# Patient Record
Sex: MALE | Race: WHITE | NOT HISPANIC OR LATINO | Employment: OTHER | URBAN - METROPOLITAN AREA
[De-identification: names, ages, dates, MRNs, and addresses within clinical notes are randomized per-mention and may not be internally consistent; named-entity substitution may affect disease eponyms.]

---

## 2018-07-23 ENCOUNTER — HISTORICAL (OUTPATIENT)
Dept: RADIOLOGY | Facility: HOSPITAL | Age: 58
End: 2018-07-23

## 2021-09-08 LAB
ABS NEUT (OLG): 5.1 X10(3)/MCL (ref 1.5–6.9)
ALBUMIN SERPL-MCNC: 4.3 GM/DL (ref 3.5–5)
ALBUMIN/GLOB SERPL: 1.6 RATIO (ref 1.1–2)
ALP SERPL-CCNC: 94 UNIT/L (ref 40–150)
ALT SERPL-CCNC: 34 UNIT/L (ref 0–55)
APTT PPP: 28.3 SEC (ref 23.4–34.9)
AST SERPL-CCNC: 30 UNIT/L (ref 5–34)
BILIRUB SERPL-MCNC: 1 MG/DL
BILIRUBIN DIRECT+TOT PNL SERPL-MCNC: 0.3 MG/DL (ref 0–0.5)
BILIRUBIN DIRECT+TOT PNL SERPL-MCNC: 0.7 MG/DL (ref 0–0.8)
BUN SERPL-MCNC: 12 MG/DL (ref 8.4–25.7)
CALCIUM SERPL-MCNC: 8.8 MG/DL (ref 8.4–10.2)
CHLORIDE SERPL-SCNC: 108 MMOL/L (ref 98–107)
CO2 SERPL-SCNC: 25 MMOL/L (ref 22–29)
CREAT SERPL-MCNC: 1.02 MG/DL (ref 0.73–1.18)
ERYTHROCYTE [DISTWIDTH] IN BLOOD BY AUTOMATED COUNT: 12.3 % (ref 11.5–17)
EST CREAT CLEARANCE SER (OHS): 98.7 ML/MIN
GLOBULIN SER-MCNC: 2.7 GM/DL (ref 2.4–3.5)
GLUCOSE SERPL-MCNC: 94 MG/DL (ref 74–100)
HCT VFR BLD AUTO: 44.5 % (ref 42–52)
HGB BLD-MCNC: 15.6 GM/DL (ref 14–18)
INR PPP: 1 (ref 2–3)
MCH RBC QN AUTO: 31 PG (ref 27–34)
MCHC RBC AUTO-ENTMCNC: 35 GM/DL (ref 31–36)
MCV RBC AUTO: 89 FL (ref 80–99)
PLATELET # BLD AUTO: 164 X10(3)/MCL (ref 140–400)
PMV BLD AUTO: 9.5 FL (ref 6.8–10)
POTASSIUM SERPL-SCNC: 3.6 MMOL/L (ref 3.5–5.1)
PROT SERPL-MCNC: 7 GM/DL (ref 6.4–8.3)
PROTHROMBIN TIME: 13 SEC (ref 11.7–14.5)
RBC # BLD AUTO: 5.01 X10(6)/MCL (ref 4.7–6.1)
SODIUM SERPL-SCNC: 139 MMOL/L (ref 136–145)
WBC # SPEC AUTO: 7.9 X10(3)/MCL (ref 4.5–11.5)

## 2021-09-09 ENCOUNTER — HISTORICAL (OUTPATIENT)
Dept: ANESTHESIOLOGY | Facility: HOSPITAL | Age: 61
End: 2021-09-09

## 2022-04-29 NOTE — OP NOTE
PREOPERATIVE DIAGNOSIS:  Screening colonoscopy.    POSTOPERATIVE DIAGNOSIS:  Left-sided diverticular disease, suboptimal prep, otherwise no pathology.    INDICATIONS:  60-year-old white male upon whom I performed colonoscopy in 2016, within normal limits.  He is back here for screening.  No abnormalities noted at that time.  He has noted average colorectal cancer risks.  No alarm symptoms or signs.     He was consented for the procedure prior to.  The risks of the procedure were explained to him in detail.  He is willing to undergo the risks.    DESCRIPTION OF PROCEDURE:  He was brought down to the endoscopy suite and laid in the left lateral decubitus position, and rectal exam was performed.  There were no abnormalities noted.  Prostate was normal size and normal shape without any nodularity.  Rectal exam tone was appropriate.     The Olympus colonoscope was easily passed all the way to the cecum.  The cecum was visualized and photographed.  I saw the terminal ileal orifice.  It was photographed, partially intubated.  There were no abnormalities noted.  Prep was suboptimal.     360-degree circumferential views were taken of the entirety of the colon.  The cecum was free of any mass, lesions, or polyps, as well as the ascending colon, hepatic flexure, transverse colon, and splenic flexure.  Just in the descending colon, he did have some diverticular disease that was mild.  Otherwise, no polyps or masses noted.  Rectum in retroflexion showed no polyps, masses, or any other mucosal changes.  The scope was withdrawn.  Patient tolerated the procedure well.     In summary, this is a 60-year-old white male with left-sided diverticular disease and suboptimal prep.    RECOMMENDATIONS:  Repeat colonoscopy in 5 years.       Thank you, Dr. Radha Reynoso, for allowing me to participate in the care of your patient.        MICHELINE/ANTHONY   DD: 09/09/2021 0927   DT: 09/09/2021 0943  Job # [id number]    cc: Dr. Garcia  [place]

## 2023-01-09 ENCOUNTER — HOSPITAL ENCOUNTER (OUTPATIENT)
Dept: RADIOLOGY | Facility: HOSPITAL | Age: 63
Discharge: HOME | End: 2023-01-09
Attending: FAMILY MEDICINE
Payer: COMMERCIAL

## 2023-01-09 DIAGNOSIS — M25.551 PAIN IN RIGHT HIP: Primary | ICD-10-CM

## 2023-01-09 DIAGNOSIS — M25.551 PAIN IN RIGHT HIP: ICD-10-CM

## 2023-01-09 PROCEDURE — 72170 X-RAY EXAM OF PELVIS: CPT | Mod: TC

## 2023-01-10 ENCOUNTER — HOSPITAL ENCOUNTER (OUTPATIENT)
Dept: RADIOLOGY | Facility: HOSPITAL | Age: 63
Discharge: HOME | End: 2023-01-10
Attending: FAMILY MEDICINE
Payer: COMMERCIAL

## 2023-01-10 DIAGNOSIS — M25.551 PAIN IN RIGHT HIP: ICD-10-CM

## 2023-01-10 PROCEDURE — 73721 MRI JNT OF LWR EXTRE W/O DYE: CPT | Mod: TC,RT

## 2023-03-28 ENCOUNTER — HOSPITAL ENCOUNTER (OUTPATIENT)
Dept: RADIOLOGY | Facility: HOSPITAL | Age: 63
Discharge: HOME | End: 2023-03-28
Attending: FAMILY MEDICINE
Payer: COMMERCIAL

## 2023-03-28 DIAGNOSIS — R10.32 LEFT LOWER QUADRANT PAIN: ICD-10-CM

## 2023-03-28 LAB
CREAT SERPL-MCNC: 1.2 MG/DL (ref 0.5–1.4)
SAMPLE: NORMAL

## 2023-03-28 PROCEDURE — 74177 CT ABD & PELVIS W/CONTRAST: CPT | Mod: TC

## 2023-03-28 PROCEDURE — 25500020 PHARM REV CODE 255: Mod: 59 | Performed by: FAMILY MEDICINE

## 2023-03-28 RX ADMIN — DIATRIZOATE MEGLUMINE AND DIATRIZOATE SODIUM 30 ML: 660; 100 LIQUID ORAL; RECTAL at 11:03

## 2023-03-28 RX ADMIN — IOPAMIDOL 100 ML: 755 INJECTION, SOLUTION INTRAVENOUS at 11:03

## 2023-05-22 ENCOUNTER — HOSPITAL ENCOUNTER (OUTPATIENT)
Dept: RADIOLOGY | Facility: HOSPITAL | Age: 63
Discharge: HOME | End: 2023-05-22
Attending: FAMILY MEDICINE
Payer: COMMERCIAL

## 2023-05-22 DIAGNOSIS — R10.84 GENERALIZED ABDOMINAL PAIN: ICD-10-CM

## 2023-05-22 LAB
CREAT SERPL-MCNC: 1.1 MG/DL (ref 0.5–1.4)
SAMPLE: NORMAL

## 2023-05-22 PROCEDURE — 74177 CT ABD & PELVIS W/CONTRAST: CPT | Mod: TC

## 2023-05-22 PROCEDURE — 25500020 PHARM REV CODE 255: Performed by: FAMILY MEDICINE

## 2023-05-22 RX ADMIN — IOPAMIDOL 100 ML: 755 INJECTION, SOLUTION INTRAVENOUS at 03:05

## 2023-05-22 RX ADMIN — DIATRIZOATE MEGLUMINE AND DIATRIZOATE SODIUM 30 ML: 660; 100 LIQUID ORAL; RECTAL at 03:05

## 2023-06-02 ENCOUNTER — LAB VISIT (OUTPATIENT)
Dept: LAB | Facility: HOSPITAL | Age: 63
End: 2023-06-02
Attending: FAMILY MEDICINE
Payer: COMMERCIAL

## 2023-06-02 DIAGNOSIS — R19.7 DIARRHEA OF PRESUMED INFECTIOUS ORIGIN: Primary | ICD-10-CM

## 2023-06-02 LAB
CLOSTRIDIUM DIFFICILE GDH ANTIGEN (OHS): NEGATIVE
CLOSTRIDIUM DIFFICILE TOXIN A/B (OHS): NEGATIVE

## 2023-06-02 PROCEDURE — 86318 IA INFECTIOUS AGENT ANTIBODY: CPT

## 2023-06-05 ENCOUNTER — APPOINTMENT (OUTPATIENT)
Dept: LAB | Facility: HOSPITAL | Age: 63
End: 2023-06-05
Attending: FAMILY MEDICINE
Payer: COMMERCIAL

## 2023-06-05 PROCEDURE — 87507 IADNA-DNA/RNA PROBE TQ 12-25: CPT | Performed by: FAMILY MEDICINE

## 2023-06-06 LAB

## 2025-04-24 RX ORDER — BUPROPION HYDROCHLORIDE 300 MG/1
300 TABLET ORAL
COMMUNITY
Start: 2021-09-08

## 2025-04-24 RX ORDER — TESTOSTERONE ENANTHATE 200 MG/ML
100 VIAL (ML) INTRAMUSCULAR
COMMUNITY
Start: 2021-09-08

## 2025-04-24 RX ORDER — DULOXETIN HYDROCHLORIDE 60 MG/1
60 CAPSULE, DELAYED RELEASE ORAL
COMMUNITY
Start: 2021-09-08

## 2025-04-24 RX ORDER — OXCARBAZEPINE 150 MG/1
150 TABLET, FILM COATED ORAL
COMMUNITY
Start: 2021-09-08

## 2025-04-24 RX ORDER — ALLOPURINOL 300 MG/1
300 TABLET ORAL
COMMUNITY
Start: 2021-09-08

## 2025-04-28 ENCOUNTER — ANESTHESIA EVENT (OUTPATIENT)
Facility: HOSPITAL | Age: 65
End: 2025-04-28
Payer: COMMERCIAL

## 2025-04-28 NOTE — ANESTHESIA PREPROCEDURE EVALUATION
"                                                                                                             04/28/2025  Lázaro Jane is a 64 y.o., male presents as an outpatient for left cataract extraction.    Last 3 sets of Vitals        4/24/2025    11:36 AM 4/29/2025     5:50 AM   Vitals - 1 value per visit   SYSTOLIC  155   DIASTOLIC  92   Pulse  85   Temp  36.6 °C (97.9 °F)   SPO2  97 %   Weight (lb) 193    Weight (kg) 87.544    Height 5' 8" (1.727 m)    BMI (Calculated) 29.4          Lab Results   Component Value Date    WBC 16.76 (H) 05/22/2023    HGB 17.1 05/22/2023    HCT 50.2 05/22/2023    MCV 89.2 05/22/2023     05/22/2023            Chemistry        Component Value Date/Time     05/22/2023 1605    K 4.4 05/22/2023 1605     05/22/2023 1605    CO2 26 05/22/2023 1605    BUN 14.0 05/22/2023 1605    CREATININE 1.00 05/22/2023 1605        Component Value Date/Time    CALCIUM 10.4 (H) 05/22/2023 1605    ALKPHOS 108 05/22/2023 1605    AST 48 (H) 05/22/2023 1605    ALT 50 05/22/2023 1605    BILITOT 1.7 (H) 05/22/2023 1605    EGFRNONAA >60 09/08/2021 1009      Pre-op Assessment    I have reviewed the Patient Summary Reports.    I have reviewed the NPO Status.   I have reviewed the Medications.     Review of Systems  Anesthesia Hx:               Denies Personal Hx of Anesthesia complications.                    Social:  Non-Smoker       Cardiovascular:   Functional Capacity good / => 4 METS                         Endocrine:  Diabetes, type 2               Physical Exam  General: Well nourished, Cooperative, Alert and Oriented    Airway:  Mallampati: II   Mouth Opening: Normal  TM Distance: Normal  Tongue: Normal  Neck ROM: Normal ROM    Dental:  Intact    Chest/Lungs:  Clear to auscultation, Normal Respiratory Rate    Heart:  Rate: Normal  Rhythm: Regular Rhythm      Anesthesia Plan  Type of Anesthesia, risks & benefits discussed:    Anesthesia Type: Gen Natural Airway  Intra-op Monitoring " Plan: Standard ASA Monitors  Induction:  IV  Informed Consent: Informed consent signed with the Patient and all parties understand the risks and agree with anesthesia plan.  All questions answered.   ASA Score: 2  Day of Surgery Review of History & Physical: H&P Update referred to the surgeon/provider.    Ready For Surgery From Anesthesia Perspective.     .

## 2025-04-29 ENCOUNTER — HOSPITAL ENCOUNTER (OUTPATIENT)
Facility: HOSPITAL | Age: 65
Discharge: HOME | End: 2025-04-29
Attending: OPHTHALMOLOGY | Admitting: OPHTHALMOLOGY
Payer: COMMERCIAL

## 2025-04-29 ENCOUNTER — ANESTHESIA (OUTPATIENT)
Facility: HOSPITAL | Age: 65
End: 2025-04-29
Payer: COMMERCIAL

## 2025-04-29 VITALS
WEIGHT: 193 LBS | HEART RATE: 94 BPM | TEMPERATURE: 98 F | BODY MASS INDEX: 29.25 KG/M2 | OXYGEN SATURATION: 96 % | SYSTOLIC BLOOD PRESSURE: 148 MMHG | DIASTOLIC BLOOD PRESSURE: 96 MMHG | HEIGHT: 68 IN

## 2025-04-29 DIAGNOSIS — H26.9 CATARACT: ICD-10-CM

## 2025-04-29 LAB
POCT GLUCOSE: 112 MG/DL (ref 70–110)
POCT GLUCOSE: 128 MG/DL (ref 70–110)

## 2025-04-29 PROCEDURE — 36000707: Performed by: OPHTHALMOLOGY

## 2025-04-29 PROCEDURE — 36000706: Performed by: OPHTHALMOLOGY

## 2025-04-29 PROCEDURE — 63600175 PHARM REV CODE 636 W HCPCS: Performed by: OPHTHALMOLOGY

## 2025-04-29 PROCEDURE — V2632 POST CHMBR INTRAOCULAR LENS: HCPCS | Performed by: OPHTHALMOLOGY

## 2025-04-29 PROCEDURE — 71000015 HC POSTOP RECOV 1ST HR: Performed by: OPHTHALMOLOGY

## 2025-04-29 PROCEDURE — 37000009 HC ANESTHESIA EA ADD 15 MINS: Performed by: OPHTHALMOLOGY

## 2025-04-29 PROCEDURE — 25000003 PHARM REV CODE 250

## 2025-04-29 PROCEDURE — 37000008 HC ANESTHESIA 1ST 15 MINUTES: Performed by: OPHTHALMOLOGY

## 2025-04-29 PROCEDURE — 71000016 HC POSTOP RECOV ADDL HR: Performed by: OPHTHALMOLOGY

## 2025-04-29 PROCEDURE — 63600175 PHARM REV CODE 636 W HCPCS

## 2025-04-29 PROCEDURE — 25000003 PHARM REV CODE 250: Performed by: OPHTHALMOLOGY

## 2025-04-29 PROCEDURE — 27201423 OPTIME MED/SURG SUP & DEVICES STERILE SUPPLY: Performed by: OPHTHALMOLOGY

## 2025-04-29 DEVICE — TECNIS SIMPLICITY TECNIS EYHANCE U 19.5D
Type: IMPLANTABLE DEVICE | Site: EYE | Status: FUNCTIONAL
Brand: TECNIS SIMPLICITY

## 2025-04-29 RX ORDER — BUPIVACAINE HYDROCHLORIDE 7.5 MG/ML
INJECTION, SOLUTION EPIDURAL; RETROBULBAR
Status: DISCONTINUED
Start: 2025-04-29 | End: 2025-04-29 | Stop reason: HOSPADM

## 2025-04-29 RX ORDER — LORAZEPAM 2 MG/ML
0.25 INJECTION INTRAMUSCULAR ONCE AS NEEDED
OUTPATIENT
Start: 2025-04-29 | End: 2036-09-24

## 2025-04-29 RX ORDER — POVIDONE-IODINE 5 %
SOLUTION, NON-ORAL OPHTHALMIC (EYE)
Status: DISCONTINUED | OUTPATIENT
Start: 2025-04-29 | End: 2025-04-29 | Stop reason: HOSPADM

## 2025-04-29 RX ORDER — ONDANSETRON 4 MG/1
8 TABLET, ORALLY DISINTEGRATING ORAL EVERY 6 HOURS PRN
OUTPATIENT
Start: 2025-04-29

## 2025-04-29 RX ORDER — NEOMYCIN SULFATE, POLYMYXIN B SULFATE, AND DEXAMETHASONE 3.5; 10000; 1 MG/G; [USP'U]/G; MG/G
OINTMENT OPHTHALMIC
Status: DISCONTINUED
Start: 2025-04-29 | End: 2025-04-29 | Stop reason: HOSPADM

## 2025-04-29 RX ORDER — PHENYLEPHRINE HYDROCHLORIDE 100 MG/ML
2 SOLUTION/ DROPS OPHTHALMIC
Status: COMPLETED | OUTPATIENT
Start: 2025-04-29 | End: 2025-04-29

## 2025-04-29 RX ORDER — LIDOCAINE HYDROCHLORIDE 10 MG/ML
1 INJECTION, SOLUTION EPIDURAL; INFILTRATION; INTRACAUDAL; PERINEURAL ONCE
OUTPATIENT
Start: 2025-04-29 | End: 2025-04-29

## 2025-04-29 RX ORDER — BUPIVACAINE HYDROCHLORIDE 7.5 MG/ML
INJECTION, SOLUTION EPIDURAL; RETROBULBAR
Status: DISCONTINUED | OUTPATIENT
Start: 2025-04-29 | End: 2025-04-29 | Stop reason: HOSPADM

## 2025-04-29 RX ORDER — BUPIVACAINE HYDROCHLORIDE 7.5 MG/ML
0.1 INJECTION, SOLUTION EPIDURAL; RETROBULBAR
Status: COMPLETED | OUTPATIENT
Start: 2025-04-29 | End: 2025-04-29

## 2025-04-29 RX ORDER — EPINEPHRINE 1 MG/ML
INJECTION, SOLUTION, CONCENTRATE INTRAVENOUS
Status: DISCONTINUED | OUTPATIENT
Start: 2025-04-29 | End: 2025-04-29 | Stop reason: HOSPADM

## 2025-04-29 RX ORDER — ONDANSETRON HYDROCHLORIDE 2 MG/ML
4 INJECTION, SOLUTION INTRAVENOUS DAILY PRN
OUTPATIENT
Start: 2025-04-29

## 2025-04-29 RX ORDER — GLUCAGON 1 MG
1 KIT INJECTION
OUTPATIENT
Start: 2025-04-29

## 2025-04-29 RX ORDER — EPINEPHRINE 1 MG/ML
INJECTION, SOLUTION, CONCENTRATE INTRAVENOUS
Status: DISCONTINUED
Start: 2025-04-29 | End: 2025-04-29 | Stop reason: HOSPADM

## 2025-04-29 RX ORDER — PROCHLORPERAZINE EDISYLATE 5 MG/ML
5 INJECTION INTRAMUSCULAR; INTRAVENOUS EVERY 30 MIN PRN
OUTPATIENT
Start: 2025-04-29

## 2025-04-29 RX ORDER — POVIDONE-IODINE 5 %
SOLUTION, NON-ORAL OPHTHALMIC (EYE)
Status: DISCONTINUED
Start: 2025-04-29 | End: 2025-04-29 | Stop reason: HOSPADM

## 2025-04-29 RX ORDER — IPRATROPIUM BROMIDE AND ALBUTEROL SULFATE 2.5; .5 MG/3ML; MG/3ML
3 SOLUTION RESPIRATORY (INHALATION)
OUTPATIENT
Start: 2025-04-29

## 2025-04-29 RX ORDER — LIDOCAINE HYDROCHLORIDE 40 MG/ML
INJECTION, SOLUTION RETROBULBAR
Status: DISCONTINUED
Start: 2025-04-29 | End: 2025-04-29 | Stop reason: HOSPADM

## 2025-04-29 RX ORDER — MIDAZOLAM HYDROCHLORIDE 2 MG/2ML
2 INJECTION, SOLUTION INTRAMUSCULAR; INTRAVENOUS ONCE AS NEEDED
OUTPATIENT
Start: 2025-04-29 | End: 2036-09-24

## 2025-04-29 RX ORDER — MOXIFLOXACIN 5 MG/ML
1 SOLUTION/ DROPS OPHTHALMIC
Status: COMPLETED | OUTPATIENT
Start: 2025-04-29 | End: 2025-04-29

## 2025-04-29 RX ORDER — MIDAZOLAM HYDROCHLORIDE 1 MG/ML
INJECTION INTRAMUSCULAR; INTRAVENOUS
Status: DISCONTINUED | OUTPATIENT
Start: 2025-04-29 | End: 2025-04-29

## 2025-04-29 RX ORDER — METFORMIN HYDROCHLORIDE 500 MG/1
500 TABLET, EXTENDED RELEASE ORAL NIGHTLY
COMMUNITY

## 2025-04-29 RX ORDER — LIDOCAINE HYDROCHLORIDE 40 MG/ML
INJECTION, SOLUTION RETROBULBAR
Status: DISCONTINUED | OUTPATIENT
Start: 2025-04-29 | End: 2025-04-29 | Stop reason: HOSPADM

## 2025-04-29 RX ORDER — TROPICAMIDE 10 MG/ML
2 SOLUTION/ DROPS OPHTHALMIC
Status: COMPLETED | OUTPATIENT
Start: 2025-04-29 | End: 2025-04-29

## 2025-04-29 RX ADMIN — BUPIVACAINE HYDROCHLORIDE 0.75 MG: 7.5 INJECTION, SOLUTION EPIDURAL; RETROBULBAR at 06:04

## 2025-04-29 RX ADMIN — PHENYLEPHRINE HYDROCHLORIDE 2 DROP: 100 SOLUTION/ DROPS OPHTHALMIC at 06:04

## 2025-04-29 RX ADMIN — TROPICAMIDE 2 DROP: 10 SOLUTION/ DROPS OPHTHALMIC at 06:04

## 2025-04-29 RX ADMIN — MIDAZOLAM HYDROCHLORIDE 2 MG: 1 INJECTION, SOLUTION INTRAMUSCULAR; INTRAVENOUS at 06:04

## 2025-04-29 RX ADMIN — MOXIFLOXACIN 1 DROP: 5 SOLUTION/ DROPS OPHTHALMIC at 06:04

## 2025-04-29 NOTE — OP NOTE
[facility] Surgical - Periop Services 2nd Floor  Operative Note      Date of Procedure: 4/29/2025     Procedure: Procedure(s) (LRB):  PHACOEMULSIFICATION, CATARACT, WITH IOL INSERTION     //////left eye (Left)     Surgeons and Role:     * Marcos Odom MD - Primary    Assisting : TACHO Stout    Pre-Operative Diagnosis: Combined forms of age-related cataract of left eye [H25.812]    Post-Operative Diagnosis: Post-Op Diagnosis Codes:     * Combined forms of age-related cataract of left eye [H25.812]    Anesthesia: Monitor Anesthesia Care    Operative Findings (including complications, if any):     Description of Technical Procedures: The patient was brought into the operating suite, where the patient was correctly identified as was the operative eye via timeout.  The patient was prepped and draped in a sterile ophthalmic fashion.  A lid speculum was placed in the operative eye and the microscope was brought into place.  A 1.0mm paracentesis was then made at (6) o'clock.  The anterior chamber was filled with Endocoat.  A ( temporal) clear corneal incision was made with a 2.4 mm keratome.  A 6 mm corneal marking ring was used to whitney the cornea centered over the visual axis.  A 5.00 mm continuous curvilinear capsulorhexis was fashioned using a cystotome and microcapsular forceps.  Hydrodissection and hydrodelineation was performed with upreserved 1% Xylocaine.  The nucleus was then phacoemulsified with the Abbott phacoemulsification hand-piece with a total of (0) EFX.  The cortex was then removed with the I/A hand-piece. The lens model (DIB00) with a power of (19.5) was placed in the capsular bag.  The Helon was then removed from the eye with the I/A hand piece.  The anterior chamber was inflated and the wounds were hydrated with BSS.  The wounds were checked with Weck-Cherelle sponges and found to be watertight.  The lid speculum was removed and topical antibiotics were placed on the operative eye.  The patient  was brought to PACU in good condition

## 2025-04-29 NOTE — ANESTHESIA POSTPROCEDURE EVALUATION
Anesthesia Post Evaluation    Patient: Lázaro Jane    Procedure(s) Performed: Procedure(s) (LRB):  PHACOEMULSIFICATION, CATARACT, WITH IOL INSERTION     //////left eye (Left)    Final Anesthesia Type: MAC      Patient location during evaluation: OPS  Patient participation: Yes- Able to Participate  Level of consciousness: awake and alert  Post-procedure vital signs: reviewed and stable  Pain management: adequate  Airway patency: patent    PONV status at discharge: No PONV  Anesthetic complications: no      Cardiovascular status: blood pressure returned to baseline  Respiratory status: unassisted, room air and spontaneous ventilation  Hydration status: euvolemic  Follow-up not needed.              Vitals Value Taken Time   /96 04/29/25 05:50   Temp 36.6 °C (97.9 °F) 04/29/25 05:50   Pulse 89 04/29/25 05:50   Resp 14 04/29/25 07:03   SpO2 97 % 04/29/25 05:50         No case tracking events are documented in the log.      Pain/Pablito Score: No data recorded

## 2025-06-19 ENCOUNTER — ANESTHESIA EVENT (OUTPATIENT)
Facility: HOSPITAL | Age: 65
End: 2025-06-19
Payer: COMMERCIAL

## 2025-06-19 NOTE — ANESTHESIA PREPROCEDURE EVALUATION
"                                                                                                             06/19/2025  Lázaro Jane is a 64 y.o., male.    Pre-operative evaluation for Procedure(s) (LRB):  PHACOEMULSIFICATION, CATARACT, WITH IOL INSERTION    /////right eye (Right)      4/29/2025 L Phaco: MAC; Midazolam 2 mgIV.      Lázaro Jane is a 64 y.o. male     Problem List[1]    Review of patient's allergies indicates:  No Known Allergies    Medications Ordered Prior to Encounter[2]    Past Surgical History:   Procedure Laterality Date    BACK SURGERY  2006    L4-L5    PHACOEMULSIFICATION, CATARACT, WITH IOL INSERTION Left 4/29/2025    Procedure: PHACOEMULSIFICATION, CATARACT, WITH IOL INSERTION     //////left eye;  Surgeon: Marcos Odom MD;  Location: [address];  Service: Ophthalmology;  Laterality: Left;       Social History[3]      CBC: No results for input(s): "WBC", "RBC", "HGB", "HCT", "PLT", "MCV", "MCH", "MCHC" in the last 72 hours.    Diagnostic Studies:  CXR :  EKG :  Echo :  No results found for this or any previous visit.  Pre-op Assessment    I have reviewed the Patient Summary Reports.    I have reviewed the NPO Status.   I have reviewed the Medications.     Review of Systems  Anesthesia Hx:  No problems with previous Anesthesia   History of prior surgery of interest to airway management or planning:  Previous anesthesia: General, MAC L4-5 Surgery: with general anesthesia.       4/2025 Phaco with MAC.  Procedure performed at an [facility]. Airway issues documented on chart review include GETA     Denies Family Hx of Anesthesia complications.    Denies Personal Hx of Anesthesia complications.                    Social:  Non-Smoker Occas ETOH      Cardiovascular:                     Patient not on beta blockers  Functional Capacity good / => 4 METS                         Hepatic/GI:         Not Taking GLP-1 Agonists            Musculoskeletal:     Gout          "   Endocrine:  Diabetes, type 2    Diabetes                      Psych:  Psychiatric History anxiety depression                Physical Exam  General: Well nourished, Cooperative, Alert and Oriented    Airway:  Mallampati: II / II  Mouth Opening: Normal  TM Distance: > 6 cm  Tongue: Normal  Neck ROM: Normal ROM    Dental:  Intact        Anesthesia Plan  Type of Anesthesia, risks & benefits discussed:    Anesthesia Type: MAC  Intra-op Monitoring Plan: Standard ASA Monitors  Induction:  IV  Informed Consent: Informed consent signed with the Patient and all parties understand the risks and agree with anesthesia plan.  All questions answered.   ASA Score: 2  Day of Surgery Review of History & Physical: H&P Update referred to the surgeon/provider.I have interviewed and examined the patient. I have reviewed the patient's H&P dated:   Anesthesia Plan Notes: MAC  mask/NC, GA back-up.   Nasal cannula vs facemask supplemental oxygenation   For patients with LEONARDA/obesity, may consider SuperNoval Nasal CPAP      Ready For Surgery From Anesthesia Perspective.     .           [1] There is no problem list on file for this patient.  [2]   No current facility-administered medications on file prior to encounter.     Current Outpatient Medications on File Prior to Encounter   Medication Sig Dispense Refill    allopurinoL (ZYLOPRIM) 300 MG tablet Take 300 mg by mouth.      buPROPion (WELLBUTRIN XL) 300 MG 24 hr tablet Take 300 mg by mouth.      DULoxetine (CYMBALTA) 60 MG capsule Take 60 mg by mouth.      metFORMIN (GLUCOPHAGE-XR) 500 MG ER 24hr tablet Take 500 mg by mouth nightly.      OXcarbazepine (TRILEPTAL) 150 MG Tab Take 150 mg by mouth.      testosterone enanthate (DELATESTRYL) 200 mg/mL injection Inject 100 mg into the skin.     [3]   Social History  Socioeconomic History    Marital status:    Tobacco Use    Smoking status: Never    Smokeless tobacco: Never   Substance and Sexual Activity    Alcohol use: Yes     Comment:  Occ    Drug use: Never

## 2025-06-20 ENCOUNTER — HOSPITAL ENCOUNTER (OUTPATIENT)
Facility: HOSPITAL | Age: 65
Discharge: HOME | End: 2025-06-20
Attending: OPHTHALMOLOGY | Admitting: OPHTHALMOLOGY
Payer: COMMERCIAL

## 2025-06-20 ENCOUNTER — ANESTHESIA (OUTPATIENT)
Facility: HOSPITAL | Age: 65
End: 2025-06-20
Payer: COMMERCIAL

## 2025-06-20 VITALS
SYSTOLIC BLOOD PRESSURE: 154 MMHG | DIASTOLIC BLOOD PRESSURE: 100 MMHG | BODY MASS INDEX: 29.55 KG/M2 | HEART RATE: 86 BPM | TEMPERATURE: 99 F | OXYGEN SATURATION: 96 % | WEIGHT: 195 LBS | HEIGHT: 68 IN

## 2025-06-20 DIAGNOSIS — H26.9 CATARACT: ICD-10-CM

## 2025-06-20 LAB
POCT GLUCOSE: 111 MG/DL (ref 70–110)
POCT GLUCOSE: 113 MG/DL (ref 70–110)

## 2025-06-20 PROCEDURE — V2632 POST CHMBR INTRAOCULAR LENS: HCPCS | Performed by: OPHTHALMOLOGY

## 2025-06-20 PROCEDURE — 27201423 OPTIME MED/SURG SUP & DEVICES STERILE SUPPLY: Performed by: OPHTHALMOLOGY

## 2025-06-20 PROCEDURE — 63600175 PHARM REV CODE 636 W HCPCS: Performed by: NURSE ANESTHETIST, CERTIFIED REGISTERED

## 2025-06-20 PROCEDURE — 37000008 HC ANESTHESIA 1ST 15 MINUTES: Performed by: OPHTHALMOLOGY

## 2025-06-20 PROCEDURE — 37000009 HC ANESTHESIA EA ADD 15 MINS: Performed by: OPHTHALMOLOGY

## 2025-06-20 PROCEDURE — 25000003 PHARM REV CODE 250: Performed by: OPHTHALMOLOGY

## 2025-06-20 PROCEDURE — 63600175 PHARM REV CODE 636 W HCPCS: Performed by: OPHTHALMOLOGY

## 2025-06-20 PROCEDURE — 36000706: Performed by: OPHTHALMOLOGY

## 2025-06-20 PROCEDURE — 36000707: Performed by: OPHTHALMOLOGY

## 2025-06-20 PROCEDURE — 25000003 PHARM REV CODE 250

## 2025-06-20 PROCEDURE — 71000015 HC POSTOP RECOV 1ST HR: Performed by: OPHTHALMOLOGY

## 2025-06-20 DEVICE — TECNIS SIMPLICITY TECNIS EYHANCE U 20.0D
Type: IMPLANTABLE DEVICE | Site: EYE | Status: FUNCTIONAL
Brand: TECNIS SIMPLICITY

## 2025-06-20 RX ORDER — POVIDONE-IODINE 5 %
SOLUTION, NON-ORAL OPHTHALMIC (EYE)
Status: DISCONTINUED
Start: 2025-06-20 | End: 2025-06-20 | Stop reason: HOSPADM

## 2025-06-20 RX ORDER — BUPIVACAINE HYDROCHLORIDE 7.5 MG/ML
0.1 INJECTION, SOLUTION EPIDURAL; RETROBULBAR
Status: COMPLETED | OUTPATIENT
Start: 2025-06-20 | End: 2025-06-20

## 2025-06-20 RX ORDER — BUPIVACAINE HYDROCHLORIDE 7.5 MG/ML
INJECTION, SOLUTION EPIDURAL; RETROBULBAR
Status: DISCONTINUED
Start: 2025-06-20 | End: 2025-06-20 | Stop reason: HOSPADM

## 2025-06-20 RX ORDER — EPINEPHRINE 1 MG/ML
INJECTION, SOLUTION, CONCENTRATE INTRAVENOUS
Status: DISCONTINUED | OUTPATIENT
Start: 2025-06-20 | End: 2025-06-20 | Stop reason: HOSPADM

## 2025-06-20 RX ORDER — POVIDONE-IODINE 5 %
SOLUTION, NON-ORAL OPHTHALMIC (EYE)
Status: DISCONTINUED | OUTPATIENT
Start: 2025-06-20 | End: 2025-06-20 | Stop reason: HOSPADM

## 2025-06-20 RX ORDER — EPINEPHRINE 1 MG/ML
INJECTION, SOLUTION, CONCENTRATE INTRAVENOUS
Status: DISCONTINUED
Start: 2025-06-20 | End: 2025-06-20 | Stop reason: HOSPADM

## 2025-06-20 RX ORDER — LIDOCAINE HYDROCHLORIDE 40 MG/ML
INJECTION, SOLUTION RETROBULBAR
Status: DISCONTINUED | OUTPATIENT
Start: 2025-06-20 | End: 2025-06-20 | Stop reason: HOSPADM

## 2025-06-20 RX ORDER — MOXIFLOXACIN 5 MG/ML
1 SOLUTION/ DROPS OPHTHALMIC
Status: COMPLETED | OUTPATIENT
Start: 2025-06-20 | End: 2025-06-20

## 2025-06-20 RX ORDER — LIDOCAINE HYDROCHLORIDE 40 MG/ML
INJECTION, SOLUTION RETROBULBAR
Status: DISCONTINUED
Start: 2025-06-20 | End: 2025-06-20 | Stop reason: HOSPADM

## 2025-06-20 RX ORDER — TROPICAMIDE 10 MG/ML
2 SOLUTION/ DROPS OPHTHALMIC
Status: COMPLETED | OUTPATIENT
Start: 2025-06-20 | End: 2025-06-20

## 2025-06-20 RX ORDER — BUPIVACAINE HYDROCHLORIDE 7.5 MG/ML
INJECTION, SOLUTION EPIDURAL; RETROBULBAR
Status: DISCONTINUED | OUTPATIENT
Start: 2025-06-20 | End: 2025-06-20 | Stop reason: HOSPADM

## 2025-06-20 RX ORDER — PHENYLEPHRINE HYDROCHLORIDE 100 MG/ML
2 SOLUTION/ DROPS OPHTHALMIC
Status: COMPLETED | OUTPATIENT
Start: 2025-06-20 | End: 2025-06-20

## 2025-06-20 RX ORDER — MIDAZOLAM HYDROCHLORIDE 1 MG/ML
INJECTION INTRAMUSCULAR; INTRAVENOUS
Status: DISCONTINUED | OUTPATIENT
Start: 2025-06-20 | End: 2025-06-20

## 2025-06-20 RX ADMIN — BUPIVACAINE HYDROCHLORIDE 0.75 MG: 7.5 INJECTION, SOLUTION EPIDURAL; RETROBULBAR at 05:06

## 2025-06-20 RX ADMIN — MOXIFLOXACIN 1 DROP: 5 SOLUTION/ DROPS OPHTHALMIC at 05:06

## 2025-06-20 RX ADMIN — TROPICAMIDE 2 DROP: 10 SOLUTION/ DROPS OPHTHALMIC at 05:06

## 2025-06-20 RX ADMIN — PHENYLEPHRINE HYDROCHLORIDE 2 DROP: 100 SOLUTION/ DROPS OPHTHALMIC at 05:06

## 2025-06-20 RX ADMIN — BUPIVACAINE HYDROCHLORIDE 0.75 MG: 7.5 INJECTION, SOLUTION EPIDURAL; RETROBULBAR at 06:06

## 2025-06-20 RX ADMIN — TROPICAMIDE 2 DROP: 10 SOLUTION/ DROPS OPHTHALMIC at 06:06

## 2025-06-20 RX ADMIN — MOXIFLOXACIN 1 DROP: 5 SOLUTION/ DROPS OPHTHALMIC at 06:06

## 2025-06-20 RX ADMIN — PHENYLEPHRINE HYDROCHLORIDE 2 DROP: 100 SOLUTION/ DROPS OPHTHALMIC at 06:06

## 2025-06-20 RX ADMIN — MIDAZOLAM HYDROCHLORIDE 2 MG: 1 INJECTION, SOLUTION INTRAMUSCULAR; INTRAVENOUS at 06:06

## 2025-06-20 NOTE — OP NOTE
[facility] Surgical - Periop Services 2nd Floor  Operative Note      Date of Procedure: 6/20/2025     Procedure: Procedure(s) (LRB):  PHACOEMULSIFICATION, CATARACT, WITH IOL INSERTION    /////right eye (Right)     Surgeons and Role:     * Marcos Odom MD - Primary    Assisting : TACHO Stout    Pre-Operative Diagnosis: Combined forms of age-related cataract of right eye [H25.811]    Post-Operative Diagnosis: Post-Op Diagnosis Codes:     * Combined forms of age-related cataract of right eye [H25.811]    Anesthesia: Monitor Anesthesia Care    Operative Findings (including complications, if any):     Description of Technical Procedures: The patient was brought into the operating suite, where the patient was correctly identified as was the operative eye via timeout.  The patient was prepped and draped in a sterile ophthalmic fashion.  A lid speculum was placed in the operative eye and the microscope was brought into place.  A 1.0mm paracentesis was then made at (12) o'clock.  The anterior chamber was filled with Endocoat.  A (temporal) clear corneal incision was made with a 2.4 mm keratome.  A 6 mm corneal marking ring was used to whitney the cornea centered over the visual axis.  A 5.00 mm continuous curvilinear capsulorhexis was fashioned using a cystotome and microcapsular forceps.  Hydrodissection and hydrodelineation was performed with upreserved 1% Xylocaine.  The nucleus was then phacoemulsified with the Abbott phacoemulsification hand-piece with a total of (2) EFX.  The cortex was then removed with the I/A hand-piece. The lens model (DIB00) with a power of (20.0) was placed in the capsular bag.  The Helon was then removed from the eye with the I/A hand piece.  The anterior chamber was inflated and the wounds were hydrated with BSS.  The wounds were checked with Weck-Cherelle sponges and found to be watertight.  The lid speculum was removed and topical antibiotics were placed on the operative eye.  The  patient was brought to PACU in good condition

## 2025-06-20 NOTE — ANESTHESIA POSTPROCEDURE EVALUATION
Anesthesia Post Evaluation    Patient: Lázaro Jane    Procedure(s) Performed: Procedure(s) (LRB):  PHACOEMULSIFICATION, CATARACT, WITH IOL INSERTION    /////right eye (Right)    Final Anesthesia Type: MAC      Patient location during evaluation: OPS  Patient participation: Yes- Able to Participate  Level of consciousness: awake and alert, awake and oriented  Post-procedure vital signs: reviewed and stable  Pain management: adequate  Airway patency: patent    PONV status at discharge: No PONV  Anesthetic complications: no      Cardiovascular status: blood pressure returned to baseline, hemodynamically stable and stable  Respiratory status: unassisted, spontaneous ventilation and room air  Hydration status: euvolemic  Follow-up not needed.              Vitals Value Taken Time   /100 06/20/25 07:33   Temp ** 06/20/25 10:42   Pulse 86 06/20/25 07:33   Resp ** 06/20/25 10:42   SpO2 96 % 06/20/25 07:33         No case tracking events are documented in the log.      Pain/Pablito Score: Pablito Score: 10 (6/20/2025  7:16 AM)

## 2025-06-20 NOTE — DISCHARGE SUMMARY
[facility] Surgical - Periop Services 2nd Floor  Discharge Note  Short Stay    Procedure(s) (LRB):  PHACOEMULSIFICATION, CATARACT, WITH IOL INSERTION    /////right eye (Right)      OUTCOME: Patient tolerated treatment/procedure well without complication and is now ready for discharge.    DISPOSITION: Home or Self Care    FINAL DIAGNOSIS:  Pseudophakia Right eye     FOLLOWUP: In clinic    DISCHARGE INSTRUCTIONS:  Review patient's discharge instructions in chart     TIME SPENT ON DISCHARGE: 5 minutes

## 2025-06-20 NOTE — ANESTHESIA POSTPROCEDURE EVALUATION
Anesthesia Post Evaluation    Patient: Lázaro Jane    Procedure(s) Performed: Procedure(s) (LRB):  PHACOEMULSIFICATION, CATARACT, WITH IOL INSERTION    /////right eye (Right)    Final Anesthesia Type: MAC      Patient location during evaluation: OPS  Patient participation: Yes- Able to Participate  Level of consciousness: awake and alert and oriented  Post-procedure vital signs: reviewed and stable  Airway patency: patent    PONV status at discharge: No PONV  Anesthetic complications: no      Cardiovascular status: blood pressure returned to baseline  Respiratory status: unassisted  Hydration status: euvolemic  Follow-up not needed.              Vitals Value Taken Time   /89 06/20/25 05:50   Temp 37.1 °C (98.7 °F) 06/20/25 05:50   Pulse 83 06/20/25 05:50   Resp 16 06/20/25 07:14   SpO2 94 % 06/20/25 05:50         No case tracking events are documented in the log.      Pain/Pablito Score: No data recorded

## 2025-06-20 NOTE — TRANSFER OF CARE
"Anesthesia Transfer of Care Note    Patient: Lázaro Jane    Procedure(s) Performed: Procedure(s) (LRB):  PHACOEMULSIFICATION, CATARACT, WITH IOL INSERTION    /////right eye (Right)    Patient location: OPS    Anesthesia Type: MAC    Transport from OR: Transported from OR on room air with adequate spontaneous ventilation    Post pain: adequate analgesia    Post assessment: no apparent anesthetic complications    Post vital signs: stable    Level of consciousness: awake, alert and oriented    Complications: none    Transfer of care protocol was followed      Last vitals: Visit Vitals  BP (!) 154/89   Pulse 83   Temp 37.1 °C (98.7 °F) (Oral)   Ht 5' 8" (1.727 m)   Wt 88.5 kg (195 lb)   SpO2 (!) 94%   BMI 29.65 kg/m²     "

## (undated) DEVICE — PACK EYE SWAN DISPOSABLE

## (undated) DEVICE — TIP PHACO 30 DEG BEVEL 20GA

## (undated) DEVICE — Device

## (undated) DEVICE — ADAPTER DUAL NSL LUER M-M 7FT

## (undated) DEVICE — GLOVE SENSICARE PI MICRO 7

## (undated) DEVICE — CARTRIDGE SHOOTER

## (undated) DEVICE — GOWN POLY REINF BRTH SLV XL

## (undated) DEVICE — PACK CASSETTE TUBE ELLIPS FX

## (undated) DEVICE — TIP INTREPID I/A STR .03MM